# Patient Record
Sex: MALE
[De-identification: names, ages, dates, MRNs, and addresses within clinical notes are randomized per-mention and may not be internally consistent; named-entity substitution may affect disease eponyms.]

---

## 2023-08-10 PROBLEM — Z00.00 ENCOUNTER FOR PREVENTIVE HEALTH EXAMINATION: Status: ACTIVE | Noted: 2023-08-10

## 2023-09-27 ENCOUNTER — TRANSCRIPTION ENCOUNTER (OUTPATIENT)
Age: 53
End: 2023-09-27

## 2023-09-27 ENCOUNTER — INPATIENT (INPATIENT)
Facility: HOSPITAL | Age: 53
LOS: 0 days | Discharge: HOME | DRG: 273 | End: 2023-09-28
Attending: INTERNAL MEDICINE | Admitting: INTERNAL MEDICINE
Payer: COMMERCIAL

## 2023-09-27 VITALS
OXYGEN SATURATION: 100 % | WEIGHT: 177.91 LBS | TEMPERATURE: 98 F | RESPIRATION RATE: 15 BRPM | HEART RATE: 66 BPM | HEIGHT: 67 IN | SYSTOLIC BLOOD PRESSURE: 113 MMHG | DIASTOLIC BLOOD PRESSURE: 64 MMHG

## 2023-09-27 DIAGNOSIS — I48.0 PAROXYSMAL ATRIAL FIBRILLATION: ICD-10-CM

## 2023-09-27 LAB
ABO RH CONFIRMATION: SIGNIFICANT CHANGE UP
ANION GAP SERPL CALC-SCNC: 11 MMOL/L — SIGNIFICANT CHANGE UP (ref 5–17)
BLD GP AB SCN SERPL QL: SIGNIFICANT CHANGE UP
BUN SERPL-MCNC: 27.8 MG/DL — HIGH (ref 8–20)
CALCIUM SERPL-MCNC: 9.3 MG/DL — SIGNIFICANT CHANGE UP (ref 8.4–10.5)
CHLORIDE SERPL-SCNC: 94 MMOL/L — LOW (ref 96–108)
CO2 SERPL-SCNC: 29 MMOL/L — SIGNIFICANT CHANGE UP (ref 22–29)
CREAT SERPL-MCNC: 7.93 MG/DL — HIGH (ref 0.5–1.3)
EGFR: 8 ML/MIN/1.73M2 — LOW
GLUCOSE SERPL-MCNC: 103 MG/DL — HIGH (ref 70–99)
HCT VFR BLD CALC: 35.6 % — LOW (ref 39–50)
HGB BLD-MCNC: 11.7 G/DL — LOW (ref 13–17)
INR BLD: 1.24 RATIO — HIGH (ref 0.85–1.18)
MCHC RBC-ENTMCNC: 32.9 GM/DL — SIGNIFICANT CHANGE UP (ref 32–36)
MCHC RBC-ENTMCNC: 32.9 PG — SIGNIFICANT CHANGE UP (ref 27–34)
MCV RBC AUTO: 100 FL — SIGNIFICANT CHANGE UP (ref 80–100)
PLATELET # BLD AUTO: 100 K/UL — LOW (ref 150–400)
POTASSIUM SERPL-MCNC: 4.6 MMOL/L — SIGNIFICANT CHANGE UP (ref 3.5–5.3)
POTASSIUM SERPL-SCNC: 4.6 MMOL/L — SIGNIFICANT CHANGE UP (ref 3.5–5.3)
PROTHROM AB SERPL-ACNC: 13.7 SEC — HIGH (ref 9.5–13)
RBC # BLD: 3.56 M/UL — LOW (ref 4.2–5.8)
RBC # FLD: 12.6 % — SIGNIFICANT CHANGE UP (ref 10.3–14.5)
SODIUM SERPL-SCNC: 134 MMOL/L — LOW (ref 135–145)
WBC # BLD: 4.75 K/UL — SIGNIFICANT CHANGE UP (ref 3.8–10.5)
WBC # FLD AUTO: 4.75 K/UL — SIGNIFICANT CHANGE UP (ref 3.8–10.5)

## 2023-09-27 PROCEDURE — 93622 COMP EP EVAL L VENTR PAC&REC: CPT | Mod: 26

## 2023-09-27 PROCEDURE — 93655 ICAR CATH ABLTJ DSCRT ARRHYT: CPT

## 2023-09-27 PROCEDURE — 93656 COMPRE EP EVAL ABLTJ ATR FIB: CPT

## 2023-09-27 PROCEDURE — 93623 PRGRMD STIMJ&PACG IV RX NFS: CPT | Mod: 26

## 2023-09-27 RX ORDER — ASPIRIN/CALCIUM CARB/MAGNESIUM 324 MG
1 TABLET ORAL
Refills: 0 | DISCHARGE

## 2023-09-27 RX ORDER — FAMOTIDINE 10 MG/ML
1 INJECTION INTRAVENOUS
Refills: 0 | DISCHARGE

## 2023-09-27 RX ORDER — ASPIRIN/CALCIUM CARB/MAGNESIUM 324 MG
81 TABLET ORAL DAILY
Refills: 0 | Status: DISCONTINUED | OUTPATIENT
Start: 2023-09-27 | End: 2023-09-28

## 2023-09-27 RX ORDER — SUCRALFATE 1 G
1 TABLET ORAL EVERY 12 HOURS
Refills: 0 | Status: DISCONTINUED | OUTPATIENT
Start: 2023-09-27 | End: 2023-09-28

## 2023-09-27 RX ORDER — APIXABAN 2.5 MG/1
5 TABLET, FILM COATED ORAL
Refills: 0 | Status: DISCONTINUED | OUTPATIENT
Start: 2023-09-27 | End: 2023-09-28

## 2023-09-27 RX ORDER — ALPRAZOLAM 0.25 MG
0.25 TABLET ORAL EVERY 8 HOURS
Refills: 0 | Status: DISCONTINUED | OUTPATIENT
Start: 2023-09-27 | End: 2023-09-28

## 2023-09-27 RX ORDER — ONDANSETRON 8 MG/1
4 TABLET, FILM COATED ORAL EVERY 6 HOURS
Refills: 0 | Status: DISCONTINUED | OUTPATIENT
Start: 2023-09-27 | End: 2023-09-28

## 2023-09-27 RX ORDER — FAMOTIDINE 10 MG/ML
20 INJECTION INTRAVENOUS DAILY
Refills: 0 | Status: DISCONTINUED | OUTPATIENT
Start: 2023-09-27 | End: 2023-09-28

## 2023-09-27 RX ORDER — ACETAMINOPHEN 500 MG
650 TABLET ORAL EVERY 6 HOURS
Refills: 0 | Status: DISCONTINUED | OUTPATIENT
Start: 2023-09-27 | End: 2023-09-28

## 2023-09-27 RX ORDER — ISOSORBIDE MONONITRATE 60 MG/1
30 TABLET, EXTENDED RELEASE ORAL DAILY
Refills: 0 | Status: DISCONTINUED | OUTPATIENT
Start: 2023-09-27 | End: 2023-09-28

## 2023-09-27 RX ORDER — METOPROLOL TARTRATE 50 MG
1 TABLET ORAL
Refills: 0 | DISCHARGE

## 2023-09-27 RX ORDER — ATORVASTATIN CALCIUM 80 MG/1
20 TABLET, FILM COATED ORAL AT BEDTIME
Refills: 0 | Status: DISCONTINUED | OUTPATIENT
Start: 2023-09-27 | End: 2023-09-28

## 2023-09-27 RX ORDER — ATORVASTATIN CALCIUM 80 MG/1
1 TABLET, FILM COATED ORAL
Refills: 0 | DISCHARGE

## 2023-09-27 RX ORDER — CALCIUM ACETATE 667 MG
0 TABLET ORAL
Refills: 0 | DISCHARGE

## 2023-09-27 RX ORDER — ISOSORBIDE MONONITRATE 60 MG/1
1 TABLET, EXTENDED RELEASE ORAL
Refills: 0 | DISCHARGE

## 2023-09-27 RX ORDER — DAPAGLIFLOZIN 10 MG/1
10 TABLET, FILM COATED ORAL DAILY
Refills: 0 | Status: DISCONTINUED | OUTPATIENT
Start: 2023-09-28 | End: 2023-09-28

## 2023-09-27 RX ORDER — PANTOPRAZOLE SODIUM 20 MG/1
40 TABLET, DELAYED RELEASE ORAL
Refills: 0 | Status: DISCONTINUED | OUTPATIENT
Start: 2023-09-27 | End: 2023-09-28

## 2023-09-27 RX ORDER — METOPROLOL TARTRATE 50 MG
100 TABLET ORAL DAILY
Refills: 0 | Status: DISCONTINUED | OUTPATIENT
Start: 2023-09-27 | End: 2023-09-28

## 2023-09-27 RX ORDER — CHOLECALCIFEROL (VITAMIN D3) 125 MCG
0 CAPSULE ORAL
Refills: 0 | DISCHARGE

## 2023-09-27 RX ORDER — BENZOCAINE AND MENTHOL 5; 1 G/100ML; G/100ML
1 LIQUID ORAL
Refills: 0 | Status: DISCONTINUED | OUTPATIENT
Start: 2023-09-27 | End: 2023-09-28

## 2023-09-27 RX ORDER — FERROUS SULFATE 325(65) MG
0 TABLET ORAL
Refills: 0 | DISCHARGE

## 2023-09-27 RX ORDER — APIXABAN 2.5 MG/1
1 TABLET, FILM COATED ORAL
Refills: 0 | DISCHARGE

## 2023-09-27 RX ORDER — FUROSEMIDE 40 MG
40 TABLET ORAL ONCE
Refills: 0 | Status: COMPLETED | OUTPATIENT
Start: 2023-09-27 | End: 2023-09-27

## 2023-09-27 RX ORDER — OXYCODONE HYDROCHLORIDE 5 MG/1
5 TABLET ORAL EVERY 6 HOURS
Refills: 0 | Status: DISCONTINUED | OUTPATIENT
Start: 2023-09-27 | End: 2023-09-28

## 2023-09-27 RX ADMIN — Medication 1 GRAM(S): at 23:40

## 2023-09-27 RX ADMIN — ATORVASTATIN CALCIUM 20 MILLIGRAM(S): 80 TABLET, FILM COATED ORAL at 23:41

## 2023-09-27 RX ADMIN — APIXABAN 5 MILLIGRAM(S): 2.5 TABLET, FILM COATED ORAL at 23:40

## 2023-09-27 RX ADMIN — PANTOPRAZOLE SODIUM 40 MILLIGRAM(S): 20 TABLET, DELAYED RELEASE ORAL at 23:41

## 2023-09-27 RX ADMIN — Medication 40 MILLIGRAM(S): at 23:40

## 2023-09-27 NOTE — DISCHARGE NOTE PROVIDER - CARE PROVIDER_API CALL
Sumit Escalante  Cardiac Electrophysiology  [address] [facility], [address] [place] [id number]  Phone: [phone]  Fax: [phone]  Follow Up Time:

## 2023-09-27 NOTE — H&P PST ADULT - ATTENDING COMMENTS
All risks, benefits and alternatives discussed with patient and his aunt (Alla Hilton - [phone]). They agree to proceed. ISABEL will be performed to assess EF and evaluate MR. Should he have severe MR, will abort and have him get CT Surgery work up.    Sumit Escalante MD  Clinical Cardiac Electrophysiology

## 2023-09-27 NOTE — H&P PST ADULT - HISTORY OF PRESENT ILLNESS
Pt arrived for scheduled ISABEL/AF ablation with Dr Escalante.    Pt is a 53 year old male with underlying schizoaffective disorder, COPD, MSSA bacteremia with possible MV endocarditis (managed conservatively), cognitive deficit, ESRD on HD via left AVF (T/Th/Sat), CAD s/p multiple PCI (last 2018), DVT s/p IVC filter, paroxysmal atrial fibrillation & atrial flutter (on Apixaban) and LBBB.    Pt health care proxy is his aunt, Alla Hilton ([phone]; [email or web address]) and he is currently resides at [facility].   Pt presents today and is accompanied by his sister.     The patient was admitted to [facility] in 12/2020 with seizure-like activity during dialysis and was found to have a new LBBB. At the time she was also found to have paroxysmal atrial fibrillation & atrial flutter. He had follow up with Dr. Stack ([facility]) and an event monitor revealed paroxysmal atrial flutter AF with RVR. He underwent EP study in 3/2021 which was negative so he underwent ILR implantation. ILR has demonstrated frequent episodes of AF though with overall burden of ~3%. He has also started to have conversion pauses of up to 4 seconds which have so far been asymptomatic.    Pt denies having any dyspnea, dizziness/lightheadedness, near syncope, fevers, chills or sweats.  Pt now presents for Elective ISABEL/A.fib ablation.  [facility] contacted and confirmed pt has been compliant with strict anticoagulation regiment.  Last dose of eliquis was on 9/26 @ 17:00.  Pt also noted to be on Farxiga with last dose on 9/23.  NPO confirmed     Cardiology Summary:    ECG 07/20/23: NSR with LBBB.    TTE 03/2021: LVEF: 53%. Moderate diastolic dysfunction. Mod-severe MR. Mild-mod TR/AI.    EP  03/10/21: MDT ILR implant.  Interrogation: 3.4% AF burden. Multiple episodes of AF over past months. Conversion pauses of up to 4 seconds. No AV block.

## 2023-09-27 NOTE — DISCHARGE NOTE PROVIDER - CARE PROVIDERS DIRECT ADDRESSES
[Spouse] : spouse [[email or web address]] : CC: diffic swallowing, numbness in the hand pt accompanied by her daughter in law, Tami who served as historian. numbness in R hand- on awakening- daily for 1 yr- duration a few min - resolved after moving her fingers.  pt has neck pain no weakness or pain of the hands.   reviewed 10/7/22 labs- TSH 12.10, nl T4 cerv Radiculopathy- reviewed 12/14/23 Neuro notes- rec PT, MRI c spine and labs.  reviewed 2/26/23 MRI c spine, head  dysphagia- feeling of something in her throat when she swallows- intermittent for a few mo- freq 1-2/wk.- w solid foods.  no abd pain, wgt loss,  nausea, vomiting, melena GERD- only w certain food- occurs once a month ,DirectAddress_Unknown

## 2023-09-27 NOTE — PROGRESS NOTE ADULT - SUBJECTIVE AND OBJECTIVE BOX
Admission Criteria  Please admit the patient to the following service: CARDIOLOGY    Major Criteria:    - Continuous EKG monitoring is required for condition causing arrhythmia (hyperkalemia, etc)    - Significant volume load > 200 ml    Admit to: 3GUL     Patient is being admitted to the inpatient service due to high risk characteristics and need for further management/monitoring and is considered to be at a significantly increased risk of major adverse cardiac and vascular events if discharged.   ---

## 2023-09-27 NOTE — DISCHARGE NOTE PROVIDER - NSDCCPTREATMENT_GEN_ALL_CORE_FT
PRINCIPAL PROCEDURE  Procedure: Radiofrequency ablation (RFA) of arrhythmogenic focus for atrial fibrillation  Findings and Treatment: - Bruising at the groin, sometimes extending down the leg, and/or a small lump under the skin at the groin access site is normal and will resolve within 2 – 3 weeks.   - Occasional skipped beats or palpitations that last for a few beats are common and generally resolve within 1-2 months.   - You may walk and take stairs at a regular pace.   - Do not perform any exercise more strenuous than walking for 1 week.   - Do not strain or lift heavy objects for 1 week.  - You may shower the day after the procedure.  - Do not soak in water (such as tub baths, hot tubs, swimming, etc.) for 1 week.   - You may resume all other activities the day after the procedure.  Call your doctor if:   - you notice bleeding, redness, drainage, swelling, increased tenderness or a hot sensation around the catheter insertion site.   - your temperature is greater than 100 degrees F for more than 24 hours.  - your rapid heart rhythm returns.  - you have any questions or concerns regarding the procedure.  If significant bleeding and/or a large lump (the size of a golf ball or bigger) occurs:  - Lie flat and apply continuous direct pressure just above the puncture site for at least 10 minutes  - If the issue resolves, notify your physician immediately.    - If the bleeding cannot be controlled, please seek immediate medical attention.  If you experience increased difficulty breathing or chest pain, or if you faint or have dizzy spells, please seek immediate medical attention.

## 2023-09-27 NOTE — DISCHARGE NOTE PROVIDER - NSDCMRMEDTOKEN_GEN_ALL_CORE_FT
aspirin 81 mg oral tablet: 1 tab(s) orally once a day  atorvastatin 20 mg oral tablet: 1 tab(s) orally once a day (at bedtime)  Calcium Acetate: 667mg capsule orally three times per day  Demadex 20 mg oral tablet: 2 tab(s) orally once a day  Eliquis 5 mg oral tablet: 1 tab(s) orally 2 times a day  famotidine 20 mg oral tablet: 1 tab(s) orally once a day  Farxiga 10 mg oral tablet: 1 tab(s) orally once a day  Ferrous Sulfate: 325mg oral tablet once a day  isosorbide mononitrate 30 mg oral tablet, extended release: 1 tab(s) orally once a day  Metoprolol Succinate  mg oral tablet, extended release: 1 tab(s) orally once a day  Nephro-Adin: 0.8mg table once daily  Retacrit 10,000 units/mL injectable solution: 50 units/kg subcutaneously once a week  Vitamin D3: 50mcg oral tablet once a day

## 2023-09-27 NOTE — H&P PST ADULT - NSICDXPASTMEDICALHX_GEN_ALL_CORE_FT
PAST MEDICAL HISTORY:  Acute deep vein thrombosis (DVT)     Atrial fibrillation and flutter     CAD S/P percutaneous coronary angioplasty     Chronic obstructive pulmonary disease (COPD)     Endocarditis of mitral valve     ESRD on hemodialysis     History of left bundle branch block     S/P IVC filter     Schizoaffective disorder

## 2023-09-27 NOTE — DISCHARGE NOTE PROVIDER - HOSPITAL COURSE
Pt is a 53 year old male with underlying schizoaffective disorder (health care proxy is his aunt, Alla Hilton, SNF resident), COPD, MSSA bacteremia with possible MV endocarditis (managed conservatively), cognitive deficit, ESRD on HD via left AVF (T/Th/Sat), CAD s/p multiple PCI (last 2018), DVT s/p IVC filter, paroxysmal atrial fibrillation & atrial flutter (on Apixaban) and LBBB.  Pt now POD 1 status post elective uncomplicated Radiofrequency Ablation of atrial fibrillation (WACA/PVI, CTI) with access obtain via b/l femoral veins and hemostasis achieved with figure of 8 Pt is a 53 year old male with underlying schizoaffective disorder (health care proxy is his aunt, Alla Hilton, SNF resident), COPD, MSSA bacteremia with possible MV endocarditis (managed conservatively), cognitive deficit, ESRD on HD via left AVF (T/Th/Sat), CAD s/p multiple PCI (last 2018), DVT s/p IVC filter, paroxysmal atrial fibrillation & atrial flutter (on Apixaban) and LBBB.  Pt now POD 1 status post elective uncomplicated Radiofrequency Ablation of atrial fibrillation (WACA/PVI, CTI) with access obtain via R femoral veins and hemostasis achieved with figure of 8 Suture Pt is a 53 year old male with underlying schizoaffective disorder (health care proxy is his aunt, Alla Hilton, SNF resident), COPD, MSSA bacteremia with possible MV endocarditis (managed conservatively), cognitive deficit, ESRD on HD via left AVF (T/Th/Sat), CAD s/p multiple PCI (last 2018), DVT s/p IVC filter, paroxysmal atrial fibrillation & atrial flutter (on Apixaban) and LBBB.  Pt now POD 1 status post elective uncomplicated Radiofrequency Ablation of atrial fibrillation (WACA/PVI, CTI) with access obtain via R femoral veins and hemostasis achieved with figure of 8 Suture. The patient was observed overnight without event and was discharged home the following morning with a plan for outpatient follow up.

## 2023-09-27 NOTE — H&P PST ADULT - ASSESSMENT
Pt is a 53 year old male with underlying schizoaffective disorder (health care proxy is his aunt, Alla Hilton, SNF resident), COPD, MSSA bacteremia with possible MV endocarditis (managed conservatively), cognitive deficit, ESRD on HD via left AVF (T/Th/Sat), CAD s/p multiple PCI (last 2018), DVT s/p IVC filter, paroxysmal atrial fibrillation & atrial flutter (on Apixaban) and LBBB.  Pt now presents for Elective ISABEL/A.fib ablation.      [facility] contacted and confirmed pt has been compliant with strict anticoagulation regiment.  Last dose of eliquis was on 9/26 @ 17:00.  Pt also noted to be on Farxiga with last dose on 9/23.  NPO confirmed  Pt is a 53 year old male with underlying schizoaffective disorder (health care proxy is his aunt, Alla Hilton, SNF resident), COPD, MSSA bacteremia with possible MV endocarditis (managed conservatively), cognitive deficit, ESRD on HD via left AVF (T/Th/Sat), CAD s/p multiple PCI (last 2018), DVT s/p IVC filter, paroxysmal atrial fibrillation & atrial flutter (on Apixaban) and LBBB.  Pt now presents for Elective ISABEL/A.fib ablation.      [facility] contacted and confirmed pt has been compliant with strict anticoagulation regiment.  Last dose of eliquis was on 9/26 @ 17:00.  Pt also noted to be on Farxiga with last dose on 9/23.  NPO confirmed     Plan:  Stat Labs, EKG  Keep NPO  Nephrology consult  Consent with Attending

## 2023-09-28 ENCOUNTER — TRANSCRIPTION ENCOUNTER (OUTPATIENT)
Age: 53
End: 2023-09-28

## 2023-09-28 VITALS
HEART RATE: 74 BPM | OXYGEN SATURATION: 96 % | RESPIRATION RATE: 16 BRPM | SYSTOLIC BLOOD PRESSURE: 124 MMHG | DIASTOLIC BLOOD PRESSURE: 70 MMHG

## 2023-09-28 LAB
ANION GAP SERPL CALC-SCNC: 14 MMOL/L — SIGNIFICANT CHANGE UP (ref 5–17)
BUN SERPL-MCNC: 45.2 MG/DL — HIGH (ref 8–20)
CALCIUM SERPL-MCNC: 8.4 MG/DL — SIGNIFICANT CHANGE UP (ref 8.4–10.5)
CHLORIDE SERPL-SCNC: 96 MMOL/L — SIGNIFICANT CHANGE UP (ref 96–108)
CO2 SERPL-SCNC: 25 MMOL/L — SIGNIFICANT CHANGE UP (ref 22–29)
CREAT SERPL-MCNC: 9.02 MG/DL — HIGH (ref 0.5–1.3)
EGFR: 7 ML/MIN/1.73M2 — LOW
GLUCOSE BLDC GLUCOMTR-MCNC: 188 MG/DL — HIGH (ref 70–99)
GLUCOSE SERPL-MCNC: 194 MG/DL — HIGH (ref 70–99)
HCT VFR BLD CALC: 32.7 % — LOW (ref 39–50)
HGB BLD-MCNC: 10.8 G/DL — LOW (ref 13–17)
MAGNESIUM SERPL-MCNC: 2 MG/DL — SIGNIFICANT CHANGE UP (ref 1.6–2.6)
MCHC RBC-ENTMCNC: 32.9 PG — SIGNIFICANT CHANGE UP (ref 27–34)
MCHC RBC-ENTMCNC: 33 GM/DL — SIGNIFICANT CHANGE UP (ref 32–36)
MCV RBC AUTO: 99.7 FL — SIGNIFICANT CHANGE UP (ref 80–100)
PLATELET # BLD AUTO: 96 K/UL — LOW (ref 150–400)
POTASSIUM SERPL-MCNC: 5.4 MMOL/L — HIGH (ref 3.5–5.3)
POTASSIUM SERPL-SCNC: 5.4 MMOL/L — HIGH (ref 3.5–5.3)
RBC # BLD: 3.28 M/UL — LOW (ref 4.2–5.8)
RBC # FLD: 12.7 % — SIGNIFICANT CHANGE UP (ref 10.3–14.5)
SODIUM SERPL-SCNC: 134 MMOL/L — LOW (ref 135–145)
WBC # BLD: 5.25 K/UL — SIGNIFICANT CHANGE UP (ref 3.8–10.5)
WBC # FLD AUTO: 5.25 K/UL — SIGNIFICANT CHANGE UP (ref 3.8–10.5)

## 2023-09-28 PROCEDURE — 93623 PRGRMD STIMJ&PACG IV RX NFS: CPT

## 2023-09-28 PROCEDURE — 93355 ECHO TRANSESOPHAGEAL (TEE): CPT

## 2023-09-28 PROCEDURE — 86850 RBC ANTIBODY SCREEN: CPT

## 2023-09-28 PROCEDURE — C1732: CPT

## 2023-09-28 PROCEDURE — 93320 DOPPLER ECHO COMPLETE: CPT

## 2023-09-28 PROCEDURE — 99222 1ST HOSP IP/OBS MODERATE 55: CPT | Mod: 25

## 2023-09-28 PROCEDURE — 83735 ASSAY OF MAGNESIUM: CPT

## 2023-09-28 PROCEDURE — C1894: CPT

## 2023-09-28 PROCEDURE — 93005 ELECTROCARDIOGRAM TRACING: CPT

## 2023-09-28 PROCEDURE — 85610 PROTHROMBIN TIME: CPT

## 2023-09-28 PROCEDURE — 93613 INTRACARDIAC EPHYS 3D MAPG: CPT

## 2023-09-28 PROCEDURE — 93655 ICAR CATH ABLTJ DSCRT ARRHYT: CPT

## 2023-09-28 PROCEDURE — 93656 COMPRE EP EVAL ABLTJ ATR FIB: CPT

## 2023-09-28 PROCEDURE — C1766: CPT

## 2023-09-28 PROCEDURE — 80048 BASIC METABOLIC PNL TOTAL CA: CPT

## 2023-09-28 PROCEDURE — C9399: CPT

## 2023-09-28 PROCEDURE — 86900 BLOOD TYPING SEROLOGIC ABO: CPT

## 2023-09-28 PROCEDURE — 90935 HEMODIALYSIS ONE EVALUATION: CPT

## 2023-09-28 PROCEDURE — 82962 GLUCOSE BLOOD TEST: CPT

## 2023-09-28 PROCEDURE — 93622 COMP EP EVAL L VENTR PAC&REC: CPT

## 2023-09-28 PROCEDURE — 85027 COMPLETE CBC AUTOMATED: CPT

## 2023-09-28 PROCEDURE — C1759: CPT

## 2023-09-28 PROCEDURE — 93325 DOPPLER ECHO COLOR FLOW MAPG: CPT

## 2023-09-28 PROCEDURE — 36415 COLL VENOUS BLD VENIPUNCTURE: CPT

## 2023-09-28 PROCEDURE — C1731: CPT

## 2023-09-28 PROCEDURE — 86901 BLOOD TYPING SEROLOGIC RH(D): CPT

## 2023-09-28 PROCEDURE — 99261: CPT

## 2023-09-28 PROCEDURE — 93010 ELECTROCARDIOGRAM REPORT: CPT

## 2023-09-28 PROCEDURE — 93312 ECHO TRANSESOPHAGEAL: CPT

## 2023-09-28 PROCEDURE — C1889: CPT

## 2023-09-28 RX ORDER — DAPAGLIFLOZIN 10 MG/1
1 TABLET, FILM COATED ORAL
Refills: 0 | DISCHARGE

## 2023-09-28 RX ORDER — SUCRALFATE 1 G
10 TABLET ORAL
Qty: 300 | Refills: 0
Start: 2023-09-28 | End: 2023-10-11

## 2023-09-28 RX ORDER — CALCIUM ACETATE 667 MG
667 TABLET ORAL
Refills: 0 | Status: DISCONTINUED | OUTPATIENT
Start: 2023-09-28 | End: 2023-09-28

## 2023-09-28 RX ORDER — ERYTHROPOIETIN 10000 [IU]/ML
50 INJECTION, SOLUTION INTRAVENOUS; SUBCUTANEOUS
Refills: 0 | DISCHARGE

## 2023-09-28 RX ORDER — PANTOPRAZOLE SODIUM 20 MG/1
1 TABLET, DELAYED RELEASE ORAL
Qty: 70 | Refills: 0
Start: 2023-09-28 | End: 2023-11-22

## 2023-09-28 RX ADMIN — Medication 1 GRAM(S): at 05:24

## 2023-09-28 RX ADMIN — FAMOTIDINE 20 MILLIGRAM(S): 10 INJECTION INTRAVENOUS at 11:00

## 2023-09-28 RX ADMIN — APIXABAN 5 MILLIGRAM(S): 2.5 TABLET, FILM COATED ORAL at 10:57

## 2023-09-28 RX ADMIN — PANTOPRAZOLE SODIUM 40 MILLIGRAM(S): 20 TABLET, DELAYED RELEASE ORAL at 05:24

## 2023-09-28 RX ADMIN — ISOSORBIDE MONONITRATE 30 MILLIGRAM(S): 60 TABLET, EXTENDED RELEASE ORAL at 10:59

## 2023-09-28 RX ADMIN — Medication 81 MILLIGRAM(S): at 10:59

## 2023-09-28 RX ADMIN — Medication 667 MILLIGRAM(S): at 09:23

## 2023-09-28 RX ADMIN — Medication 100 MILLIGRAM(S): at 05:24

## 2023-09-28 RX ADMIN — DAPAGLIFLOZIN 10 MILLIGRAM(S): 10 TABLET, FILM COATED ORAL at 11:27

## 2023-09-28 RX ADMIN — Medication 667 MILLIGRAM(S): at 16:26

## 2023-09-28 RX ADMIN — Medication 1 GRAM(S): at 16:23

## 2023-09-28 RX ADMIN — Medication 667 MILLIGRAM(S): at 11:11

## 2023-09-28 RX ADMIN — PANTOPRAZOLE SODIUM 40 MILLIGRAM(S): 20 TABLET, DELAYED RELEASE ORAL at 16:23

## 2023-09-28 NOTE — CONSULT NOTE ADULT - SUBJECTIVE AND OBJECTIVE BOX
Patient is 53-year-old male with past medical history significant for she is affective disorder, end-stage renal disease on hemodialysis who was admitted for elective ISABEL and A-fib ablation.  Nephrology was consulted for managing dialysis needs.  Patient currently feeling okay and denies any chest pain.    Past medical and surgical history: Atrial fibrillation, coronary artery disease s/p PCI, COPD, endocarditis of mitral valve, end-stage renal disease on hemodialysis, she is affective disorder, IVC filter placement.    Allergies: No known drug allergy    Home Medications:  aspirin 81 mg oral tablet: 1 tab(s) orally once a day (27 Sep 2023 20:30)  atorvastatin 20 mg oral tablet: 1 tab(s) orally once a day (at bedtime) (27 Sep 2023 20:30)  Calcium Acetate: 667mg capsule orally three times per day (27 Sep 2023 20:30)  Demadex 20 mg oral tablet: 2 tab(s) orally once a day (27 Sep 2023 20:30)  Eliquis 5 mg oral tablet: 1 tab(s) orally 2 times a day (27 Sep 2023 20:30)  famotidine 20 mg oral tablet: 1 tab(s) orally once a day (27 Sep 2023 20:30)  Farxiga 10 mg oral tablet: 1 tab(s) orally once a day (28 Sep 2023 09:07)  Ferrous Sulfate: 325mg oral tablet once a day (27 Sep 2023 20:30)  isosorbide mononitrate 30 mg oral tablet, extended release: 1 tab(s) orally once a day (27 Sep 2023 20:30)  Metoprolol Succinate  mg oral tablet, extended release: 1 tab(s) orally once a day (27 Sep 2023 20:30)  Nephro-Adin: 0.8mg table once daily (27 Sep 2023 20:30)  Retacrit 10,000 units/mL injectable solution: 50 units/kg subcutaneously once a week (28 Sep 2023 09:07)  Vitamin D3: 50mcg oral tablet once a day (27 Sep 2023 20:30)    Family history: No pertinent family history of renal disease or electrolyte disorder in first degree relatives.    Social history: Denies tobacco, alcohol, drug abuse.     Vital Signs Last 24 Hrs  T(C): 36.6 (28 Sep 2023 16:33), Max: 37.1 (28 Sep 2023 05:14)  T(F): 97.8 (28 Sep 2023 16:33), Max: 98.7 (28 Sep 2023 05:14)  HR: 74 (28 Sep 2023 18:00) (71 - 88)  BP: 124/70 (28 Sep 2023 18:00) (114/67 - 146/87)  RR: 16 (28 Sep 2023 18:00) (14 - 18)  SpO2: 96% (28 Sep 2023 18:00) (91% - 99%)    Parameters below as of 28 Sep 2023 18:00  Patient On (Oxygen Delivery Method): room air    Physical Exam:  Gen: no acute distress  MS: alert, conversing normally  Eyes: EOMI, no icterus  HENT: NCAT, MMM  CV: rhythm reg reg, rate normal, no m/g/r, no LE edema  Chest: CTAB, no w/r/r,  Abd: soft, NT, ND  Neuro: moving all 4 limbs spontaneously, no tremor  MSK: normal bulk and tone, no joint swelling  Skin: dry, warm, no rash or jaundice  Access: BETINA AVF    09-28    134<L>  |  96  |  45.2<H>  ----------------------------<  194<H>  5.4<H>   |  25.0  |  9.02<H>    Ca    8.4      28 Sep 2023 04:30  Mg     2.0     09-28                          10.8   5.25  )-----------( 96       ( 28 Sep 2023 04:30 )             32.7

## 2023-09-28 NOTE — PROGRESS NOTE ADULT - SUBJECTIVE AND OBJECTIVE BOX
Pt doing well POD #1 s/p radiofrequency ablation of atrial fibrillation. Denies complaint.     EKG: SR @ 75 with LBBB, COURTNEY 186ms, QRDS 152ms   TELE: SR    MEDICATIONS  (STANDING):  apixaban 5 milliGRAM(s) Oral <User Schedule>  aspirin enteric coated 81 milliGRAM(s) Oral daily  atorvastatin 20 milliGRAM(s) Oral at bedtime  calcium acetate 667 milliGRAM(s) Oral three times a day with meals  dapagliflozin 10 milliGRAM(s) Oral daily  famotidine    Tablet 20 milliGRAM(s) Oral daily  isosorbide   mononitrate ER Tablet (IMDUR) 30 milliGRAM(s) Oral daily  metoprolol succinate  milliGRAM(s) Oral daily  pantoprazole    Tablet 40 milliGRAM(s) Oral two times a day  sucralfate suspension 1 Gram(s) Oral every 12 hours    MEDICATIONS  (PRN):  acetaminophen     Tablet .. 650 milliGRAM(s) Oral every 6 hours PRN Temp greater or equal to 38.5C (101.3F), Mild Pain (1 - 3)  ALPRAZolam 0.25 milliGRAM(s) Oral every 8 hours PRN anxiety  aluminum hydroxide/magnesium hydroxide/simethicone Suspension 30 milliLiter(s) Oral every 6 hours PRN Dyspepsia  benzocaine/menthol Lozenge 1 Lozenge Oral every 2 hours PRN Sore Throat  ondansetron Injectable 4 milliGRAM(s) IV Push every 6 hours PRN Nausea and/or Vomiting  oxyCODONE    IR 5 milliGRAM(s) Oral every 6 hours PRN Moderate Pain (4 - 6)    Allergies    No Known Allergies    PAST MEDICAL & SURGICAL HISTORY:  Schizoaffective disorder    ESRD on hemodialysis    Endocarditis of mitral valve    Chronic obstructive pulmonary disease (COPD)    Acute deep vein thrombosis (DVT)    S/P IVC filter    CAD S/P percutaneous coronary angioplasty    Atrial fibrillation and flutter    History of left bundle branch block    No significant past surgical history    Vital Signs Last 24 Hrs  T(C): 36.6 (28 Sep 2023 09:34), Max: 37.1 (28 Sep 2023 05:14)  T(F): 97.8 (28 Sep 2023 09:34), Max: 98.7 (28 Sep 2023 05:14)  HR: 76 (28 Sep 2023 09:34) (66 - 81)  BP: 146/87 (28 Sep 2023 09:34) (113/64 - 146/87)  BP(mean): 80 (27 Sep 2023 10:32) (80 - 80)  RR: 17 (28 Sep 2023 09:34) (12 - 17)  SpO2: 94% (28 Sep 2023 09:34) (91% - 100%)    Parameters below as of 28 Sep 2023 09:34  Patient On (Oxygen Delivery Method): room air    Physical Exam:  Neuro: AAOx3, SANDOVAL, FC  Cardiovascular: +S1S2 RRR  Pulmonary: CTA b/l, unlabored  Abd: soft NTND  Groins: C/D/I bilaterally; no bleeding, hematoma, edema  Extremities: no pedal edema, +distal pulses b/l    LABS:                        10.8   5.25  )-----------( 96       ( 28 Sep 2023 04:30 )             32.7     09-28    134<L>  |  96  |  45.2<H>  ----------------------------<  194<H>  5.4<H>   |  25.0  |  9.02<H>    Ca    8.4      28 Sep 2023 04:30  Mg     2.0     09-28      PT/INR - ( 27 Sep 2023 10:20 )   PT: 13.7 sec;   INR: 1.24 ratio      Urinalysis Basic - ( 28 Sep 2023 04:30 )    Color: x / Appearance: x / SG: x / pH: x  Gluc: 194 mg/dL / Ketone: x  / Bili: x / Urobili: x   Blood: x / Protein: x / Nitrite: x   Leuk Esterase: x / RBC: x / WBC x   Sq Epi: x / Non Sq Epi: x / Bacteria: x    Assessment:   Pt is a 53 year old male with underlying schizoaffective disorder (health care proxy is his aunt, Alla Hilton, SNF resident), COPD, MSSA bacteremia with possible MV endocarditis (managed conservatively), cognitive deficit, ESRD on HD via left AVF (T/Th/Sat), CAD s/p multiple PCI (last 2018), DVT s/p IVC filter, paroxysmal atrial fibrillation & atrial flutter (on Apixaban) and LBBB.  Pt now POD 1 status post elective uncomplicated Radiofrequency Ablation of atrial fibrillation (WACA/PVI, CTI) with access obtain via R femoral veins and hemostasis achieved with figure of 8 sutures.     Plan:   Groin suture removed     C/w Eliquis   DO NOT HOLD, INTERRUPT OR REVERSE ANTICOAGULATION WITHOUT EXPLICIT APPROVAL FROM EP SERVICE.   Resume patient home torsemide  ESRD on HD, Nephrology consult appreciated, plan for HD this morning and D/c back to [facility] once HD complete   C/w Protonix 40mg twice daily x 2 weeks, then once daily x 6 weeks.   C/w Carafate 1gm BID x 2 weeks.   Continue other home medications.   Pt instructed as to activity limitations - no lifting/pushing/pulling >10 lbs or strenuous exercise x 1 week.   Pt instructed as to access site care and f/up - written instructions included in d/c documents.  Outpt f/up in 2-4 weeks - office will contact pt to schedule.

## 2023-09-28 NOTE — CONSULT NOTE ADULT - ASSESSMENT
53-year-old male with past medical history significant for she is affective disorder, end-stage renal disease on hemodialysis who was admitted for elective ISABEL and A-fib ablation.  Nephrology was consulted for managing dialysis needs.    1.  ESRD on HD  -Outpatient HD days: Tuesday, Thursday, Saturday.   -Access: LUEAVF working fine  -I planned for HD today   -And, later patient was seen on HD.  Qd, Qb, UF rate reviewed.  Vitals stable.  Access working well.  Patient tolerating HD well.  2.  Hypertension: BP WNL. Resume home Antihypertensive medications, will follow and titrate further if needed.   3.  Anemia:  Hb at goal. No ELIAS.  4.  Hyperkalemia: Expected to improve post hemodialysis.  Will follow.  5.  A-fib/flutter: S/p ISABEL and ablation    Consent for dialysis was obtained from aunt telephonically.

## 2023-09-28 NOTE — DISCHARGE NOTE NURSING/CASE MANAGEMENT/SOCIAL WORK - NSDCFUADDAPPT_GEN_ALL_CORE_FT
D/C to [facility]   [address] [place] [address]    Resume HD T/TH/S 7:45pm at [facility]

## 2023-09-28 NOTE — DISCHARGE NOTE NURSING/CASE MANAGEMENT/SOCIAL WORK - NSDCPETBCESMAN_GEN_ALL_CORE
General Information:  1.  Do not drink alcoholic beverages including beer for 24 hours or as long as you are on pain medication..  2.  Do not drive a motor vehicle, operate machinery or power tools, or signs legal papers for 24 hours or as long as you are on pain medication.   3.  You may experience light-headedness, dizziness, and sleepiness following surgery. Please do not stay alone. A responsible adult should be with you for this 24 hour period.  4.  Go home and rest.  5. Progress slowly to a normal diet unless instructed.  Otherwise, begin with liquids such as soft drinks, then soup and crackers working up to solid foods. Drink plenty of nonalcoholic fluids.  6.  Certain anesthetics and pain medications produce nausea and vomiting in certain       individuals. If nausea becomes a problem at home, call you doctor.  7. A nurse will be calling you sometime after surgery. Do not be alarmed. This is our way of finding out how you are doing.  8. Several times every hour while you are awake, take 2-3 deep breaths and cough. If you had stomach surgery hold a pillow or rolled towel firmly against your stomach before you cough. This will help with any pain the cough might cause.  9. Several times every hour while you are awake, pump and flex your feet 5-6 times and do foot circles. This will help prevent blood clots.  10.Call your doctor for severe pain, bleeding, fever, or signs or symptoms of infection (pain, swelling, redness, foul odor, drainage).  11.You can contact your doctor anytime by [phone] for the [facility] (at [facility]) or [phone] for the [facility] on [facility].   [email or web address] is another way to contact your doctor if you are an active participant online with [facility].          
  Problem: Pressure Injury, Risk for  Goal: # Skin remains intact  Outcome: Outcome Met, Continue evaluating goal progress toward completion     Problem: Pressure Injury Actual  Goal: # No deterioration in pressure injury (PI)  Outcome: Outcome Met, Continue evaluating goal progress toward completion         
If you are a smoker, it is important for your health to stop smoking. Please be aware that second hand smoke is also harmful.

## 2023-09-28 NOTE — DISCHARGE NOTE NURSING/CASE MANAGEMENT/SOCIAL WORK - PATIENT PORTAL LINK FT
You can access the FollowMyHealth Patient Portal offered by [facility] by registering at the following website: [email or web address]. By joining [facility]’s FollowMyHealth portal, you will also be able to view your health information using other applications (apps) compatible with our system.